# Patient Record
Sex: MALE | Race: WHITE | NOT HISPANIC OR LATINO | Employment: FULL TIME | URBAN - METROPOLITAN AREA
[De-identification: names, ages, dates, MRNs, and addresses within clinical notes are randomized per-mention and may not be internally consistent; named-entity substitution may affect disease eponyms.]

---

## 2017-08-28 ENCOUNTER — HOSPITAL ENCOUNTER (EMERGENCY)
Facility: OTHER | Age: 22
Discharge: HOME | End: 2017-08-28
Attending: EMERGENCY MEDICINE
Payer: COMMERCIAL

## 2017-08-28 VITALS
WEIGHT: 155 LBS | BODY MASS INDEX: 19.27 KG/M2 | RESPIRATION RATE: 16 BRPM | DIASTOLIC BLOOD PRESSURE: 65 MMHG | TEMPERATURE: 98 F | SYSTOLIC BLOOD PRESSURE: 128 MMHG | OXYGEN SATURATION: 98 % | HEART RATE: 82 BPM | HEIGHT: 75 IN

## 2017-08-28 DIAGNOSIS — L73.9 FOLLICULITIS: Primary | ICD-10-CM

## 2017-08-28 DIAGNOSIS — L30.9 DERMATITIS: ICD-10-CM

## 2017-08-28 PROCEDURE — 99282 EMERGENCY DEPT VISIT SF MDM: CPT

## 2017-08-29 ENCOUNTER — HOSPITAL ENCOUNTER (EMERGENCY)
Facility: OTHER | Age: 22
Discharge: PSYCHIATRIC OR SUBSTANCE USE FACILITY | End: 2017-08-29
Attending: EMERGENCY MEDICINE
Payer: COMMERCIAL

## 2017-08-29 VITALS
HEIGHT: 75 IN | OXYGEN SATURATION: 100 % | RESPIRATION RATE: 18 BRPM | BODY MASS INDEX: 19.27 KG/M2 | HEART RATE: 111 BPM | WEIGHT: 155 LBS | TEMPERATURE: 97 F | DIASTOLIC BLOOD PRESSURE: 100 MMHG | SYSTOLIC BLOOD PRESSURE: 159 MMHG

## 2017-08-29 DIAGNOSIS — F29 PSYCHOSIS: ICD-10-CM

## 2017-08-29 DIAGNOSIS — F23 ACUTE PSYCHOSIS: Primary | ICD-10-CM

## 2017-08-29 LAB
AMPHET+METHAMPHET UR QL: NEGATIVE
ANION GAP SERPL CALC-SCNC: 8 MMOL/L
APAP SERPL-MCNC: <3 UG/ML
BARBITURATES UR QL SCN>200 NG/ML: NEGATIVE
BASOPHILS # BLD AUTO: 0.01 K/UL
BASOPHILS NFR BLD: 0.2 %
BENZODIAZ UR QL SCN>200 NG/ML: NEGATIVE
BILIRUB UR QL STRIP: NEGATIVE
BUN SERPL-MCNC: 16 MG/DL
BZE UR QL SCN: NEGATIVE
CALCIUM SERPL-MCNC: 9.9 MG/DL
CANNABINOIDS UR QL SCN: NEGATIVE
CHLORIDE SERPL-SCNC: 108 MMOL/L
CLARITY UR: CLEAR
CO2 SERPL-SCNC: 24 MMOL/L
COLOR UR: YELLOW
CREAT SERPL-MCNC: 0.9 MG/DL
CREAT UR-MCNC: 157.3 MG/DL
DIFFERENTIAL METHOD: ABNORMAL
EOSINOPHIL # BLD AUTO: 0 K/UL
EOSINOPHIL NFR BLD: 0.4 %
ERYTHROCYTE [DISTWIDTH] IN BLOOD BY AUTOMATED COUNT: 11.9 %
EST. GFR  (AFRICAN AMERICAN): >60 ML/MIN/1.73 M^2
EST. GFR  (NON AFRICAN AMERICAN): >60 ML/MIN/1.73 M^2
ETHANOL SERPL-MCNC: <10 MG/DL
GLUCOSE SERPL-MCNC: 108 MG/DL
GLUCOSE UR QL STRIP: NEGATIVE
HCT VFR BLD AUTO: 39.8 %
HGB BLD-MCNC: 14 G/DL
HGB UR QL STRIP: NEGATIVE
KETONES UR QL STRIP: ABNORMAL
LEUKOCYTE ESTERASE UR QL STRIP: NEGATIVE
LITHIUM SERPL-SCNC: <0.1 MMOL/L
LYMPHOCYTES # BLD AUTO: 1.6 K/UL
LYMPHOCYTES NFR BLD: 34 %
MCH RBC QN AUTO: 31.3 PG
MCHC RBC AUTO-ENTMCNC: 35.2 G/DL
MCV RBC AUTO: 89 FL
METHADONE UR QL SCN>300 NG/ML: NEGATIVE
MONOCYTES # BLD AUTO: 0.5 K/UL
MONOCYTES NFR BLD: 10 %
NEUTROPHILS # BLD AUTO: 2.7 K/UL
NEUTROPHILS NFR BLD: 55.2 %
NITRITE UR QL STRIP: NEGATIVE
OPIATES UR QL SCN: NEGATIVE
PCP UR QL SCN>25 NG/ML: NEGATIVE
PH UR STRIP: 6 [PH] (ref 5–8)
PLATELET # BLD AUTO: 239 K/UL
PMV BLD AUTO: 9.3 FL
POTASSIUM SERPL-SCNC: 4 MMOL/L
PROT UR QL STRIP: NEGATIVE
RBC # BLD AUTO: 4.47 M/UL
SALICYLATES SERPL-MCNC: <5 MG/DL
SODIUM SERPL-SCNC: 140 MMOL/L
SP GR UR STRIP: 1.02 (ref 1–1.03)
TOXICOLOGY INFORMATION: NORMAL
URN SPEC COLLECT METH UR: ABNORMAL
UROBILINOGEN UR STRIP-ACNC: NEGATIVE EU/DL
WBC # BLD AUTO: 4.8 K/UL

## 2017-08-29 PROCEDURE — 25000003 PHARM REV CODE 250: Performed by: PSYCHIATRY & NEUROLOGY

## 2017-08-29 PROCEDURE — 80178 ASSAY OF LITHIUM: CPT

## 2017-08-29 PROCEDURE — 80307 DRUG TEST PRSMV CHEM ANLYZR: CPT

## 2017-08-29 PROCEDURE — 80048 BASIC METABOLIC PNL TOTAL CA: CPT

## 2017-08-29 PROCEDURE — 85025 COMPLETE CBC W/AUTO DIFF WBC: CPT

## 2017-08-29 PROCEDURE — 81003 URINALYSIS AUTO W/O SCOPE: CPT

## 2017-08-29 PROCEDURE — 93010 ELECTROCARDIOGRAM REPORT: CPT | Mod: ,,, | Performed by: INTERNAL MEDICINE

## 2017-08-29 PROCEDURE — 93005 ELECTROCARDIOGRAM TRACING: CPT

## 2017-08-29 PROCEDURE — 25000003 PHARM REV CODE 250: Performed by: EMERGENCY MEDICINE

## 2017-08-29 PROCEDURE — 80329 ANALGESICS NON-OPIOID 1 OR 2: CPT

## 2017-08-29 PROCEDURE — 99285 EMERGENCY DEPT VISIT HI MDM: CPT | Mod: 25

## 2017-08-29 PROCEDURE — 80320 DRUG SCREEN QUANTALCOHOLS: CPT

## 2017-08-29 RX ORDER — LORAZEPAM 1 MG/1
1 TABLET ORAL
Status: DISCONTINUED | OUTPATIENT
Start: 2017-08-29 | End: 2017-08-29

## 2017-08-29 RX ORDER — OLANZAPINE 10 MG/1
10 TABLET, ORALLY DISINTEGRATING ORAL
Status: COMPLETED | OUTPATIENT
Start: 2017-08-29 | End: 2017-08-29

## 2017-08-29 RX ORDER — LITHIUM CARBONATE 300 MG/1
300 CAPSULE ORAL
Status: DISCONTINUED | OUTPATIENT
Start: 2017-08-29 | End: 2017-08-29 | Stop reason: HOSPADM

## 2017-08-29 RX ORDER — LORAZEPAM 1 MG/1
2 TABLET ORAL EVERY 4 HOURS PRN
Status: DISCONTINUED | OUTPATIENT
Start: 2017-08-29 | End: 2017-08-29 | Stop reason: HOSPADM

## 2017-08-29 RX ORDER — RISPERIDONE 1 MG/1
1 TABLET ORAL 2 TIMES DAILY
Status: DISCONTINUED | OUTPATIENT
Start: 2017-08-29 | End: 2017-08-29 | Stop reason: HOSPADM

## 2017-08-29 RX ORDER — OLANZAPINE 10 MG/1
10 TABLET, ORALLY DISINTEGRATING ORAL EVERY 8 HOURS PRN
Status: DISCONTINUED | OUTPATIENT
Start: 2017-08-29 | End: 2017-08-29 | Stop reason: HOSPADM

## 2017-08-29 RX ORDER — LORAZEPAM 1 MG/1
1 TABLET ORAL
Status: COMPLETED | OUTPATIENT
Start: 2017-08-29 | End: 2017-08-29

## 2017-08-29 RX ADMIN — OLANZAPINE 10 MG: 10 TABLET, ORALLY DISINTEGRATING ORAL at 12:08

## 2017-08-29 RX ADMIN — RISPERIDONE 1 MG: 1 TABLET ORAL at 02:08

## 2017-08-29 RX ADMIN — LITHIUM CARBONATE 300 MG: 300 CAPSULE, GELATIN COATED ORAL at 02:08

## 2017-08-29 RX ADMIN — LORAZEPAM 1 MG: 1 TABLET ORAL at 09:08

## 2017-08-29 NOTE — ED PROVIDER NOTES
"Encounter Date: 8/29/2017    SCRIBE #1 NOTE: I, Syed Dumont, am scribing for, and in the presence of, Dr. Dominguez.       History     Chief Complaint   Patient presents with    Psychiatric Evaluation     Pt sent to ED for eval of hearing voices. denies HI, and SI.     8:50 AM    Patient is a 21 year old male, with Asperger syndrome and Bipolar disorder, who presents to the ED via EMS with anxitey. Patient reports he is feeling very nervous and stressed due to the storm and "the campus environment." He reports intermittent auditory hallucinations, but denies and SI or HI. He states "litium helps me with my bipolar" and asks to be prescribed lithium He has not taken lithium in approximately two years. He last saw a psychiatrist December 2016 in [place]. He is not currently taking any medication for bipolar disorder. He does not take any other prescription medications. He was seen in the ED last night.      The history is provided by the patient.     Review of patient's allergies indicates:   Allergen Reactions    Amoxicillin      Past Medical History:   Diagnosis Date    Asperger syndrome     Bipolar 1 disorder      History reviewed. No pertinent surgical history.  History reviewed. No pertinent family history.  Social History   Substance Use Topics    Smoking status: Former Smoker    Smokeless tobacco: Never Used    Alcohol use No     Review of Systems   Constitutional: Negative for fever.   HENT: Negative for sore throat.    Respiratory: Negative for shortness of breath.    Cardiovascular: Negative for chest pain.   Gastrointestinal: Negative for nausea.   Genitourinary: Negative for dysuria.   Musculoskeletal: Negative for back pain.   Skin: Negative for rash.   Neurological: Negative for weakness.   Hematological: Does not bruise/bleed easily.   Psychiatric/Behavioral: Negative for self-injury and suicidal ideas. The patient is nervous/anxious.        Physical Exam     Initial Vitals [08/29/17 " 0840]   BP Pulse Resp Temp SpO2   136/72 62 18 97.5 °F (36.4 °C) 100 %      MAP       93.33         Physical Exam    Nursing note and vitals reviewed.  Constitutional: He appears well-developed and well-nourished.   HENT:   Head: Normocephalic and atraumatic.   Eyes: Conjunctivae and EOM are normal. Pupils are equal, round, and reactive to light.   Neck: Normal range of motion. Neck supple.   Cardiovascular: Normal rate, regular rhythm and normal heart sounds. Exam reveals no gallop and no friction rub.    No murmur heard.  Pulmonary/Chest: Breath sounds normal. No respiratory distress.   Musculoskeletal: Normal range of motion.   Neurological: He is alert and oriented to person, place, and time. He has normal strength.   Skin: Skin is warm and dry.   Psychiatric:   Anxious appearing with a flat affect. No suicidal or homicidal ideations. Normal mood.         ED Course   Procedures  Labs Reviewed   CBC W/ AUTO DIFFERENTIAL   BASIC METABOLIC PANEL   URINALYSIS   ACETAMINOPHEN LEVEL   SALICYLATE LEVEL   DRUG SCREEN PANEL, URINE EMERGENCY   ALCOHOL,MEDICAL (ETHANOL)   LITHIUM LEVEL             Medical Decision Making:   ED Management:  A 21-year-old male with history of aspirin or syndrome and bipolar 1 disorder presents with anxiety.  He states he is intermittently hearing voices but is not suicidal or homicidal.  The voices are not telling him to hurt himself or anybody else.  He states he needs lithium, but has not taken lithium in approximately 2 years.  It has been almost 9 months since he last saw a psychiatrist.  He presents today very anxious stating he is anxious about the weather and the mood at [facility] where he attends.  At time of initial examination I do not have an indication at this time to place him on a PEC.  I will give him Ativan.  I do feel consultation with psychiatry would be beneficial and will call Dr. Padron to see if he can come by and evaluate the patient.    10:35 AM - Spoke with  father who reports this is a long term problem, and patient has been in and out of units. He reports son has cycle where he goes to units, is treated with lithium, feels better, leaves, then stops taking medication. He also reports it has been 9 months since son has received lithium.    12:00 PM still awaiting Dr. Padron to come down and evaluate the patient.  The patient is currently stable and comfortable.    12:45 PM I spoke with Dr. Padron.  He stated he'll be here approximate 45-50 minutes.  The patient has become increasingly anxious and wants to leave.  I made him aware that the psychiatrist would be here within the hour.  He stated he would wait.  Give the patient Zyprexa and food.    2:22 PM Dr. Padron came down and evaluated the patient.  Patient well-known the emergency department is had increased hallucinations and agitation.  He felt patient needed to be placed on a PEC for acute psychosis.  I called the patient's father at phone number [phone] and gave him an update.  We'll go ahead and order the necessary labs for the psychiatric facility however based on my assessment the patient is medically cleared for psychiatric evaluation.            Scribe Attestation:   Scribe #1: I performed the above scribed service and the documentation accurately describes the services I performed. I attest to the accuracy of the note.    Attending Attestation:           Physician Attestation for Scribe:  Physician Attestation Statement for Scribe #1: I, Dr. Dominguez, reviewed documentation, as scribed by Syed Dumont in my presence, and it is both accurate and complete.                 ED Course     Clinical Impression:     1. Acute psychosis                                 Irvin Dominguez, DO  08/29/17 1426       Irvin Dominguez, DO  08/29/17 1426

## 2017-08-29 NOTE — ED TRIAGE NOTES
"Pt presents to ER w/ reports of + anxiety " I have not been on my Lithium for two years. I just feel really nervous with all of the weather around the city". Pt denies SI or HI currently. Pt currently alert, able to answer questions appropriately, following commands and denying auditory or visual hallucination at this time.   "

## 2017-08-29 NOTE — ED NOTES
Pt undressed, searched for harmful objects, and placed in paper gown. Belongings secured at nurses station.

## 2017-08-29 NOTE — ED NOTES
"Pt stating, " I will stay and wait for Ismail to come see me". MD Harris speaking with Ismail at this time on telephone  "

## 2017-08-29 NOTE — ED NOTES
Patient identifiers verified and correct for Adrián Gottlieb.    LOC: The patient is awake, alert and aware of environment with an appropriate affect, the patient is oriented x 3 and speaking appropriately. Pt denies SI/HI, auditory or visual hallucinations at this time.   APPEARANCE: Patient resting comfortably and in no acute distress, patient is clean and well groomed, patient's clothing is properly fastened.  SKIN: The skin is warm and dry, color consistent with ethnicity, patient has normal skin turgor and moist mucus membranes, skin intact, no breakdown or bruising noted.  MUSCULOSKELETAL: Patient moving all extremities spontaneously, no obvious swelling or deformities noted.  RESPIRATORY: Airway is open and patent, respirations are spontaneous, patient has a normal effort and rate, no accessory muscle use noted, bilateral breath sounds clear.  CARDIAC: Patient has a normal rate and regular rhythm, no periphreal edema noted, capillary refill < 3 seconds. Pt denies active chest pain, SOB, dizziness, weakness or blurred vision at this time.   ABDOMEN: Soft and non tender to palpation, no distention noted. Pt denies abdominal pains, cramping, discomfort, N/V?D at this time.   NEUROLOGIC: PERRL, 3 mm bilaterally, eyes open spontaneously, behavior appropriate to situation, follows commands, facial expression symmetrical, bilateral hand grasp equal and even, purposeful motor response noted, normal sensation in all extremities when touched with a finger.

## 2017-08-29 NOTE — ED NOTES
Sadie with [facility] states Pt to be accepted for Dr Ruff. Pt updated on POC.

## 2017-08-29 NOTE — ED NOTES
"Pt states," I am ready leave now". Dr. Ruff called stating he will be here to evaluate pt within the hour if pt wishes to leave to follow up with Psych. Pt made aware of current plan of care stating,' I don't really feel like waiting for him to come here. I really would rather leave and go home". MD Harris aware of pt's request to leave, calling Speedy at this time, message left for Speedy. Will call back. Kimberly CALIX at bedside speaking with pt.   "

## 2017-08-29 NOTE — ED NOTES
Pt belongings: 1 pair brown sandals, 1 pair headphones, 1 gray shirt, 1 wallet, 1 cell phone, 1 pair maroon shorts. Belongings given to security.

## 2017-08-29 NOTE — ED PROVIDER NOTES
"Encounter Date: 8/28/2017    SCRIBE #1 NOTE: I, Pema Gomez, am scribing for, and in the presence of, Dr. Leary.       History     Chief Complaint   Patient presents with    Medication Refill     Pt presents to ED with c/o needing a refill on his Lithium medication. States he has been off of them for approx 11 mths. Denies any SI, HI at this time.        Time seen by provider: 7:58 PM on 08/28/2017    Adrián Gottlieb is a 21 y.o. male with Asperger syndrome and bipolar 1 disorder who presents to the ED for a medication refills. He would like a refill of Lithium after a recent "episode" but states that he hasn't been on it in almost a year. The patient would also like a Clindamycin gel for his dry, flaky skin patches on his face and BUE's. He denies smoking tobacco, drinking alcohol, illegal drug use, using lotions or chemicals on his skin, history of seborrheic dermatitis or psoriasis, SI, HI, or any other symptoms at this time. No SHx noted. Amoxicillin drug allergy noted.      The history is provided by the patient.     Review of patient's allergies indicates:   Allergen Reactions    Amoxicillin      Past Medical History:   Diagnosis Date    Asperger syndrome     Bipolar 1 disorder      History reviewed. No pertinent surgical history.  History reviewed. No pertinent family history.  Social History   Substance Use Topics    Smoking status: Former Smoker    Smokeless tobacco: Never Used    Alcohol use No     Review of Systems   Constitutional: Negative for chills and fever.        Positive for medication refills.    HENT: Negative for congestion and sore throat.    Eyes: Negative for photophobia and redness.   Respiratory: Negative for cough and shortness of breath.    Cardiovascular: Negative for chest pain.   Gastrointestinal: Negative for abdominal pain, nausea and vomiting.   Genitourinary: Negative for dysuria.   Musculoskeletal: Negative for back pain.   Skin: Positive for rash (dry, flaky). "   Neurological: Negative for weakness, light-headedness and headaches.   Psychiatric/Behavioral: Negative for confusion and suicidal ideas.        Negative for homicidal ideas.      Physical Exam     Initial Vitals [08/28/17 1945]   BP Pulse Resp Temp SpO2   128/65 82 16 98.4 °F (36.9 °C) 98 %      MAP       86         Physical Exam    Nursing note and vitals reviewed.  Constitutional: He appears well-developed and well-nourished. He is not diaphoretic. No distress.   HENT:   Head: Normocephalic and atraumatic.   Mouth/Throat: Oropharynx is clear and moist.   Eyes: Conjunctivae and EOM are normal. Pupils are equal, round, and reactive to light.   Neck: Normal range of motion. Neck supple.   Cardiovascular: Normal rate, regular rhythm, S1 normal, S2 normal and normal heart sounds. Exam reveals no gallop and no friction rub.    No murmur heard.  Pulmonary/Chest: Breath sounds normal. No respiratory distress. He has no wheezes. He has no rhonchi. He has no rales.   Abdominal: Soft. Bowel sounds are normal. There is no tenderness. There is no rebound and no guarding.   Musculoskeletal: Normal range of motion. He exhibits no edema or tenderness.   No lower extremity edema.    Lymphadenopathy:     He has no cervical adenopathy.   Neurological: He is alert and oriented to person, place, and time.   Skin: Skin is warm and dry. Lesion and rash noted. No abscess noted. No pallor.   Patches of dry, flaky skin on face and under neck as well as 2-3 lesions to UE's consistent with folliculitis. No evidence of cellulitis or abscess.    Psychiatric: He has a normal mood and affect. His behavior is normal. Judgment and thought content normal.       ED Course   Procedures  Labs Reviewed - No data to display        Medical Decision Making:   History:   Old Medical Records: I decided to obtain old medical records.            Scribe Attestation:   Scribe #1: I performed the above scribed service and the documentation accurately  describes the services I performed. I attest to the accuracy of the note.    Attending Attestation:           Physician Attestation for Scribe:  Physician Attestation Statement for Scribe #1: I, Dr. Leary, reviewed documentation, as scribed by Pema Gomez in my presence, and it is both accurate and complete.         Attending ED Notes:   Urgent evaluation of 21-year-old male requesting to start retaking lithium after he has been off of the medication for a year.  Patient also complaining of rash to arms and face.  Rash on arms is consistent with a mild folliculitis.  Rash to face is consistent with dermatitis.  No evidence of cellulitis or abscess formation.  It is explained to patient that I do not feel comfortable prescribing patient lithium after he has not been on it for over a year and that patient would need to follow-up with psychiatry for further evaluation and possible treatment with lithium or other medication.  Patient denies any SI, HI.  The patient is extensive the counseled on his diagnosis and treatment, discharged in good condition and directed to follow-up with his PCP, dermatology and psychiatry in the next 24-48 hours.          ED Course     Clinical Impression:     1. Folliculitis    2. Dermatitis          Disposition:   Disposition: Discharged  Condition: Stable                        Adrián Leary MD  08/28/17 9736

## 2017-08-29 NOTE — ED NOTES
Message left for Patient father. Address and phone number to receiving facility left on voicemail.

## 2017-08-29 NOTE — ED NOTES
Patient moved to ED room 6 via EMS, patient assisted onto stretcher and changed into a gown. Bed placed in low locked position, side rails up x 2, call light is within reach of patient or family, orientation to room and explanation of wait provided to family and patient, alarms set and turned on for monitor and pulse ox, awaiting MD evaluation and orders, will continue to monitor. Ingrid Palma, remains at bedside making 1:1 observations of pt.

## 2017-08-29 NOTE — ED NOTES
LOC: The patient is awake, alert and aware of environment with an appropriate affect, the patient is oriented x 3 and speaking appropriately.  APPEARANCE: Patient resting comfortably and in no acute distress, patient is clean and well groomed, patient's clothing is properly fastened.  SKIN: The skin is warm and dry, patient has normal skin turgor and moist mucus membranes, skin intact, no breakdown or brusing noted.  MUSKULOSKELETAL: Patient moving all extremities well, no obvious swelling or deformities noted.  RESPIRATORY: Airway is open and patent, respirations are spontaneous, patient has a normal effort and rate. Breath sounds are clear and equal bilaterally.  CARDIAC: Normal heart sounds. No peripheral edema.  ABDOMEN: Soft and non tender to palpation, no distention noted. Bowel sounds present.  NEURO: No neuro deficits, hand grasp equal, no drift noted, no facial droop noted. Speech is clear.  Pt presents to ED stating he needs a refill on his Lithium. Pt is calm, cooperative at this time.

## 2017-08-29 NOTE — ED NOTES
Pt remains calm and cooperative, lying in bed with eyes closed, respirations even and unlabored. Pt confirms that he continues to hear voices. Sitter posted for direct psych observation.

## 2017-08-30 NOTE — PSYCH
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  This consult is requested by   Dr. Dominguez for psych evaluation.  The patient is on a PEC status.    CHIEF COMPLAINT:  "I want back on my medicine.  My enemies are talking to me."    HISTORY OF PRESENT ILLNESS:  This patient is known to me from previous admission   consult with similar problems that is with anxiety, depression and   hallucinations.  The patient states that he was diagnosed with bipolar in 2014   and was seeing a psychiatrist in [place].  He has not seen a psychiatrist   since December.  He is off his lithium for about two years.  According to the   patient's father, he has been in and out of [facility] and he hears voices.    The patient states that he is on the higher side lately.  He states that he is   a little hyperactive, restless and acting weird.  The patient states that he was   depressed prior to that and was feeling depressed, sad and helpless.  The   patient states that he was very anxious, partly because of [place] environment   and weather.  He feels anxious and decided to go back on his medication.  The   patient states that he was hearing voices and the voices were talking and his   enemies were talking different things.  The patient is hyperval today and was talking   about  the churches.  The patient states that he is hearing unpleasant   voices.  The patient used to hear voices that they are going to kill them.  The   patient stated "They're back for the last week."  The patient stopped taking his   lithium two years ago and he wants to go back on his lithium.  The patient's   urine is positive for marijuana.  He drinks beer now and then.  The patient   denies history of obsessive-compulsive features.  His sleep is not good lately   and was up last night.  The " patient states that he was anxious, fearful and   nervous and decided to come and get his refills.  He smokes marijuana.  The   patient is drowsy at this time due to p.r.n. Zyprexa.  He has a history of   alcoholism too.  The patient had first drink at the age of 19.  He had several   manic episodes in the past during which he is elated and his high lasts for a   few days.  The patient believes that he was manic yesterday.    PAST PSYCHIATRIC HISTORY:  The patient had first psychiatric hospitalization at   the age of 13 when he was hospitalized at [facility].  His last admission   was at [facility] on 08/28/2016.  The patient was on lithium 600 mg   three times a day, Risperdal 2 mg in morning and 2.5 at nighttime and  mg at   nighttime.  He has no history of arrest, DWI or legal problems.  He has a   history of noncompliance with his medications, especially when he gets better.    He has no history of suicide.  He has a psychiatrist in [place].    SOCIAL AND FAMILY HISTORY:  The patient was born and raised in [place], [place].  He described his childhood as not good because he was always   depressed.  He was diagnosed as autistic and then Asperger's.  He is fluent in   his speech.  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  He is single and has no   children.  He has no girlfriends.  He avoids social gatherings.  He is pretty   high functioning and has good language.  The patient has cousins with mental   illness.    MENTAL STATUS EXAMINATION:  This is a 21-year-old white male who has seborrheic   rash on face, exhibited fair eye contact.  He is drowsy due to p.r.n. received   an hour ago.  His mood is anxious with worried affect.  His thoughts are   jumbled, disorganized and not connected.  He has good memory when he is alert.    Last time, he was able to recall 3 objects out of 3 immediately,3 out of 3   after 5 minutes and events of the past.  He has no intention to harm self or    others.  He is hearing voices of people and they are, according to him, enemies   and talking different stuff, which he does not like.  He has no thoughts of harm   to self.  His insight and judgment are fair.  He is an average intelligent   person.    PSYCHIATRIC DIAGNOSES:  AXIS I:  Bipolar disorder, mixed without psychotic features, Asperger's   syndrome, social phobia, alcohol use disorder, cannabis use disorder.  AXIS II:  No diagnosis.  AXIS III:  Seborrheic dermatitis.  AXIS IV:  Academic problem, away from home, noncompliant with medicines, chronic   mental illness.  AXIS V:  35.    RECOMMENDATIONS:  1.  We will transfer this patient to [facility] for further management.    We will restart lithium 300 mg 3 times a day.  We will monitor the rash, which   could exacerbate with the lithium.  2.  We will initiate Risperdal 1 mg p.o. b.i.d.  We will increase to 4 mg, which   was his last discharge dose.  We will get in touch with the father about   collaborative information and current stressors.  His blood pressure is 190/109,   with 84 pulse.  His labs are pending.  His last urine drug screen was positive   for marijuana.  We will help the client to learn coping skills to deal with his   chronic mental illness in a positive manner.    PROGNOSIS:  Fair.    ESTIMATED LENGTH OF STAY IN HOSPITAL:  3 to 5 days.    CRITERIA FOR DISCHARGE:  The patient will show improvement in flores, depression,   compliant with medicine and auditory hallucinations.    ABLE TO GIVE CONSENT:  Yes.    ASSETS:  The patient is verbal, cooperative and had good response to medication   in the past.    PROBLEM LIST AND TREATMENT PLAN:  1.  Unstable mood, recent manic.  2.  Auditory hallucinations.  3.  Substance abuse problem, noncompliant with medications.        /rambo [id number] nica(s)          ADWOA/HN  dd: 08/29/2017 14:31:40 (CDT)  td: 08/29/2017 18:49:44 (CDT)  Doc ID   #[id number]  Job ID #[id number]    CC:

## 2017-08-30 NOTE — ED NOTES
Patients glasses were left at nurses station.  Glasses placed in bag with labels on outside and given to security.

## 2021-09-15 ENCOUNTER — HOSPITAL ENCOUNTER (EMERGENCY)
Facility: HOSPITAL | Age: 26
Discharge: PSYCHIATRIC OR SUBSTANCE USE FACILITY | End: 2021-09-16
Attending: EMERGENCY MEDICINE
Payer: COMMERCIAL

## 2021-09-15 DIAGNOSIS — R44.0 AUDITORY HALLUCINATION: ICD-10-CM

## 2021-09-15 DIAGNOSIS — F22 DELUSIONS: Primary | ICD-10-CM

## 2021-09-15 DIAGNOSIS — F23 ACUTE PSYCHOSIS: ICD-10-CM

## 2021-09-15 LAB
BASOPHILS # BLD AUTO: 0.06 K/UL (ref 0–0.2)
BASOPHILS NFR BLD: 1 % (ref 0–1.9)
DIFFERENTIAL METHOD: ABNORMAL
EOSINOPHIL # BLD AUTO: 0.1 K/UL (ref 0–0.5)
EOSINOPHIL NFR BLD: 1.2 % (ref 0–8)
ERYTHROCYTE [DISTWIDTH] IN BLOOD BY AUTOMATED COUNT: 11.5 % (ref 11.5–14.5)
HCT VFR BLD AUTO: 39.9 % (ref 40–54)
HGB BLD-MCNC: 14 G/DL (ref 14–18)
IMM GRANULOCYTES # BLD AUTO: 0.02 K/UL (ref 0–0.04)
IMM GRANULOCYTES NFR BLD AUTO: 0.3 % (ref 0–0.5)
LYMPHOCYTES # BLD AUTO: 2.3 K/UL (ref 1–4.8)
LYMPHOCYTES NFR BLD: 37.7 % (ref 18–48)
MCH RBC QN AUTO: 31.8 PG (ref 27–31)
MCHC RBC AUTO-ENTMCNC: 35.1 G/DL (ref 32–36)
MCV RBC AUTO: 91 FL (ref 82–98)
MONOCYTES # BLD AUTO: 0.4 K/UL (ref 0.3–1)
MONOCYTES NFR BLD: 7.2 % (ref 4–15)
NEUTROPHILS # BLD AUTO: 3.2 K/UL (ref 1.8–7.7)
NEUTROPHILS NFR BLD: 52.6 % (ref 38–73)
NRBC BLD-RTO: 0 /100 WBC
PLATELET # BLD AUTO: 256 K/UL (ref 150–450)
PMV BLD AUTO: 9.5 FL (ref 9.2–12.9)
RBC # BLD AUTO: 4.4 M/UL (ref 4.6–6.2)
WBC # BLD AUTO: 6 K/UL (ref 3.9–12.7)

## 2021-09-15 PROCEDURE — 85025 COMPLETE CBC W/AUTO DIFF WBC: CPT | Performed by: EMERGENCY MEDICINE

## 2021-09-15 PROCEDURE — 99285 PR EMERGENCY DEPT VISIT,LEVEL V: ICD-10-PCS | Mod: CS,,, | Performed by: EMERGENCY MEDICINE

## 2021-09-15 PROCEDURE — 80053 COMPREHEN METABOLIC PANEL: CPT | Performed by: EMERGENCY MEDICINE

## 2021-09-15 PROCEDURE — 84443 ASSAY THYROID STIM HORMONE: CPT | Performed by: EMERGENCY MEDICINE

## 2021-09-15 PROCEDURE — 81003 URINALYSIS AUTO W/O SCOPE: CPT | Mod: 59 | Performed by: EMERGENCY MEDICINE

## 2021-09-15 PROCEDURE — 99285 EMERGENCY DEPT VISIT HI MDM: CPT | Mod: CS,,, | Performed by: EMERGENCY MEDICINE

## 2021-09-15 PROCEDURE — 99285 EMERGENCY DEPT VISIT HI MDM: CPT | Mod: 25

## 2021-09-15 PROCEDURE — U0002 COVID-19 LAB TEST NON-CDC: HCPCS | Performed by: EMERGENCY MEDICINE

## 2021-09-15 PROCEDURE — 80047 BASIC METABLC PNL IONIZED CA: CPT

## 2021-09-15 PROCEDURE — 82077 ASSAY SPEC XCP UR&BREATH IA: CPT | Performed by: EMERGENCY MEDICINE

## 2021-09-15 PROCEDURE — 80307 DRUG TEST PRSMV CHEM ANLYZR: CPT | Performed by: EMERGENCY MEDICINE

## 2021-09-15 PROCEDURE — 80143 DRUG ASSAY ACETAMINOPHEN: CPT | Performed by: EMERGENCY MEDICINE

## 2021-09-16 VITALS
DIASTOLIC BLOOD PRESSURE: 61 MMHG | OXYGEN SATURATION: 98 % | SYSTOLIC BLOOD PRESSURE: 110 MMHG | TEMPERATURE: 99 F | HEART RATE: 60 BPM | RESPIRATION RATE: 16 BRPM

## 2021-09-16 LAB
ALBUMIN SERPL BCP-MCNC: 4.5 G/DL (ref 3.5–5.2)
ALP SERPL-CCNC: 63 U/L (ref 55–135)
ALT SERPL W/O P-5'-P-CCNC: 18 U/L (ref 10–44)
AMPHET+METHAMPHET UR QL: NEGATIVE
ANION GAP SERPL CALC-SCNC: 12 MMOL/L (ref 8–16)
APAP SERPL-MCNC: <3 UG/ML (ref 10–20)
AST SERPL-CCNC: 23 U/L (ref 10–40)
BARBITURATES UR QL SCN>200 NG/ML: NEGATIVE
BENZODIAZ UR QL SCN>200 NG/ML: NEGATIVE
BILIRUB SERPL-MCNC: 0.7 MG/DL (ref 0.1–1)
BILIRUB UR QL STRIP: NEGATIVE
BUN SERPL-MCNC: 25 MG/DL (ref 6–20)
BUN SERPL-MCNC: 25 MG/DL (ref 6–30)
BZE UR QL SCN: NEGATIVE
CALCIUM SERPL-MCNC: 6.7 MG/DL (ref 8.7–10.5)
CANNABINOIDS UR QL SCN: NEGATIVE
CHLORIDE SERPL-SCNC: 105 MMOL/L (ref 95–110)
CHLORIDE SERPL-SCNC: 109 MMOL/L (ref 95–110)
CLARITY UR REFRACT.AUTO: CLEAR
CO2 SERPL-SCNC: 19 MMOL/L (ref 23–29)
COLOR UR AUTO: YELLOW
CREAT SERPL-MCNC: 0.9 MG/DL (ref 0.5–1.4)
CREAT SERPL-MCNC: 1.2 MG/DL (ref 0.5–1.4)
CREAT UR-MCNC: 303 MG/DL (ref 23–375)
CTP QC/QA: YES
EST. GFR  (AFRICAN AMERICAN): >60 ML/MIN/1.73 M^2
EST. GFR  (NON AFRICAN AMERICAN): >60 ML/MIN/1.73 M^2
ETHANOL SERPL-MCNC: <10 MG/DL
GLUCOSE SERPL-MCNC: 173 MG/DL (ref 70–110)
GLUCOSE SERPL-MCNC: 84 MG/DL (ref 70–110)
GLUCOSE UR QL STRIP: NEGATIVE
HCT VFR BLD CALC: 41 %PCV (ref 36–54)
HGB UR QL STRIP: NEGATIVE
KETONES UR QL STRIP: ABNORMAL
LEUKOCYTE ESTERASE UR QL STRIP: NEGATIVE
METHADONE UR QL SCN>300 NG/ML: NEGATIVE
NITRITE UR QL STRIP: NEGATIVE
OPIATES UR QL SCN: NEGATIVE
PCP UR QL SCN>25 NG/ML: NEGATIVE
PH UR STRIP: 5 [PH] (ref 5–8)
POC IONIZED CALCIUM: 1.26 MMOL/L (ref 1.06–1.42)
POC TCO2 (MEASURED): 23 MMOL/L (ref 23–29)
POTASSIUM BLD-SCNC: 3.9 MMOL/L (ref 3.5–5.1)
POTASSIUM SERPL-SCNC: 5.8 MMOL/L (ref 3.5–5.1)
PROT SERPL-MCNC: 7.3 G/DL (ref 6–8.4)
PROT UR QL STRIP: NEGATIVE
SAMPLE: ABNORMAL
SARS-COV-2 RDRP RESP QL NAA+PROBE: NEGATIVE
SODIUM BLD-SCNC: 141 MMOL/L (ref 136–145)
SODIUM SERPL-SCNC: 140 MMOL/L (ref 136–145)
SP GR UR STRIP: 1.03 (ref 1–1.03)
TOXICOLOGY INFORMATION: NORMAL
TSH SERPL DL<=0.005 MIU/L-ACNC: 0.52 UIU/ML (ref 0.4–4)
URN SPEC COLLECT METH UR: ABNORMAL

## 2021-09-16 PROCEDURE — 25000003 PHARM REV CODE 250: Performed by: EMERGENCY MEDICINE

## 2021-09-16 RX ORDER — TALC
6 POWDER (GRAM) TOPICAL
Status: COMPLETED | OUTPATIENT
Start: 2021-09-16 | End: 2021-09-16

## 2021-09-16 RX ORDER — HALOPERIDOL 5 MG/ML
5 INJECTION INTRAMUSCULAR ONCE AS NEEDED
Status: DISCONTINUED | OUTPATIENT
Start: 2021-09-16 | End: 2021-09-16 | Stop reason: HOSPADM

## 2021-09-16 RX ORDER — OLANZAPINE 10 MG/1
10 TABLET ORAL
Status: COMPLETED | OUTPATIENT
Start: 2021-09-16 | End: 2021-09-16

## 2021-09-16 RX ORDER — LORAZEPAM 2 MG/ML
2 INJECTION INTRAMUSCULAR ONCE AS NEEDED
Status: DISCONTINUED | OUTPATIENT
Start: 2021-09-16 | End: 2021-09-16 | Stop reason: HOSPADM

## 2021-09-16 RX ADMIN — MELATONIN TAB 3 MG 6 MG: 3 TAB at 02:09

## 2021-09-16 RX ADMIN — OLANZAPINE 10 MG: 10 TABLET, FILM COATED ORAL at 02:09
